# Patient Record
Sex: FEMALE | URBAN - METROPOLITAN AREA
[De-identification: names, ages, dates, MRNs, and addresses within clinical notes are randomized per-mention and may not be internally consistent; named-entity substitution may affect disease eponyms.]

---

## 2023-02-14 ENCOUNTER — WEB ENCOUNTER (OUTPATIENT)
Dept: URBAN - METROPOLITAN AREA CLINIC 98 | Facility: CLINIC | Age: 53
End: 2023-02-14

## 2023-02-16 ENCOUNTER — OFFICE VISIT (OUTPATIENT)
Dept: URBAN - METROPOLITAN AREA CLINIC 98 | Facility: CLINIC | Age: 53
End: 2023-02-16
Payer: COMMERCIAL

## 2023-02-16 ENCOUNTER — DASHBOARD ENCOUNTERS (OUTPATIENT)
Age: 53
End: 2023-02-16

## 2023-02-16 VITALS
HEIGHT: 55 IN | BODY MASS INDEX: 25.92 KG/M2 | DIASTOLIC BLOOD PRESSURE: 77 MMHG | SYSTOLIC BLOOD PRESSURE: 117 MMHG | TEMPERATURE: 97.4 F | WEIGHT: 112 LBS | HEART RATE: 86 BPM

## 2023-02-16 DIAGNOSIS — R06.02 SHORTNESS OF BREATH: ICD-10-CM

## 2023-02-16 DIAGNOSIS — R19.5 POSITIVE COLORECTAL CANCER SCREENING USING COLOGUARD TEST: ICD-10-CM

## 2023-02-16 PROCEDURE — 99243 OFF/OP CNSLTJ NEW/EST LOW 30: CPT | Performed by: INTERNAL MEDICINE

## 2023-02-16 PROCEDURE — 99203 OFFICE O/P NEW LOW 30 MIN: CPT | Performed by: INTERNAL MEDICINE

## 2023-02-16 RX ORDER — SODIUM, POTASSIUM,MAG SULFATES 17.5-3.13G
177ML SOLUTION, RECONSTITUTED, ORAL ORAL ONCE
Qty: 177 ML | Refills: 0 | OUTPATIENT
Start: 2023-02-16 | End: 2023-02-17
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
## 2023-02-20 ENCOUNTER — OFFICE VISIT (OUTPATIENT)
Dept: URBAN - METROPOLITAN AREA CLINIC 78 | Facility: CLINIC | Age: 53
End: 2023-02-20

## 2023-03-11 ENCOUNTER — WEB ENCOUNTER (OUTPATIENT)
Dept: URBAN - METROPOLITAN AREA CLINIC 98 | Facility: CLINIC | Age: 53
End: 2023-03-11

## 2023-03-13 ENCOUNTER — OFFICE VISIT (OUTPATIENT)
Dept: URBAN - METROPOLITAN AREA MEDICAL CENTER 28 | Facility: MEDICAL CENTER | Age: 53
End: 2023-03-13
Payer: COMMERCIAL

## 2023-03-13 DIAGNOSIS — R19.5 ABNORMAL CONSISTENCY OF STOOL: ICD-10-CM

## 2023-03-13 DIAGNOSIS — D12.2 ADENOMA OF ASCENDING COLON: ICD-10-CM

## 2023-03-13 DIAGNOSIS — D12.4 ADENOMA OF DESCENDING COLON: ICD-10-CM

## 2023-03-13 PROCEDURE — 45385 COLONOSCOPY W/LESION REMOVAL: CPT | Performed by: INTERNAL MEDICINE

## 2023-03-15 ENCOUNTER — OFFICE VISIT (OUTPATIENT)
Dept: URBAN - METROPOLITAN AREA MEDICAL CENTER 28 | Facility: MEDICAL CENTER | Age: 53
End: 2023-03-15
Payer: COMMERCIAL

## 2023-03-15 DIAGNOSIS — R19.5 ABNORMAL CONSISTENCY OF STOOL: ICD-10-CM

## 2023-03-15 PROCEDURE — 992 APS NON BILLABLE: Performed by: INTERNAL MEDICINE
